# Patient Record
Sex: MALE | Race: WHITE | NOT HISPANIC OR LATINO | URBAN - METROPOLITAN AREA
[De-identification: names, ages, dates, MRNs, and addresses within clinical notes are randomized per-mention and may not be internally consistent; named-entity substitution may affect disease eponyms.]

---

## 2018-09-13 ENCOUNTER — EMERGENCY (EMERGENCY)
Facility: HOSPITAL | Age: 43
LOS: 0 days | Discharge: HOME | End: 2018-09-13
Attending: EMERGENCY MEDICINE | Admitting: EMERGENCY MEDICINE

## 2018-09-13 VITALS
HEART RATE: 64 BPM | SYSTOLIC BLOOD PRESSURE: 142 MMHG | RESPIRATION RATE: 18 BRPM | OXYGEN SATURATION: 99 % | HEIGHT: 67 IN | TEMPERATURE: 97 F | DIASTOLIC BLOOD PRESSURE: 95 MMHG | WEIGHT: 220.02 LBS

## 2018-09-13 DIAGNOSIS — S60.222A CONTUSION OF LEFT HAND, INITIAL ENCOUNTER: ICD-10-CM

## 2018-09-13 DIAGNOSIS — Z91.041 RADIOGRAPHIC DYE ALLERGY STATUS: ICD-10-CM

## 2018-09-13 DIAGNOSIS — Z79.899 OTHER LONG TERM (CURRENT) DRUG THERAPY: ICD-10-CM
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
## 2018-09-13 DIAGNOSIS — M79.643 PAIN IN UNSPECIFIED HAND: ICD-10-CM

## 2018-09-13 DIAGNOSIS — I10 ESSENTIAL (PRIMARY) HYPERTENSION: ICD-10-CM
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
## 2018-09-13 RX ORDER — LOSARTAN POTASSIUM 100 MG/1
1 TABLET, FILM COATED ORAL
Qty: 0 | Refills: 0 | COMMUNITY

## 2018-09-13 NOTE — ED PROVIDER NOTE - NS ED ROS FT
Review of Systems  Constitutional: (-) fever  Musculoskeletal: (+) swelling   Integumentary: (-) rash, (-) laceration  Neurological: (-) numbness

## 2018-09-13 NOTE — ED PROVIDER NOTE - PHYSICAL EXAMINATION
Vital Signs: I have reviewed the initial vital signs.  Constitutional: WDWN in nad.   Integumentary: No lac or abrasion, + ecchymosis  CV: brisk cap refill  Musculoskeletal: FROM, + swelling to dorsum left hand with tendernes at middle MCP  Neurologic: AAOx3,  good tone, equal strength

## 2018-09-13 NOTE — ED PROVIDER NOTE - MEDICAL DECISION MAKING DETAILS
x ray results reviewed with pt . Will place splint, Rec ice, elevate and follow up with Ortho. Pt verbalizes understanding

## 2018-09-13 NOTE — ED PROVIDER NOTE - OBJECTIVE STATEMENT
42 yo M presents with c/o pain and swelling to left hand after getting it caught in the car door this am.  Pt used ice with improvement.

## 2021-03-28 ENCOUNTER — EMERGENCY (EMERGENCY)
Facility: HOSPITAL | Age: 46
LOS: 0 days | Discharge: HOME | End: 2021-03-28
Attending: STUDENT IN AN ORGANIZED HEALTH CARE EDUCATION/TRAINING PROGRAM | Admitting: STUDENT IN AN ORGANIZED HEALTH CARE EDUCATION/TRAINING PROGRAM
Payer: OTHER MISCELLANEOUS

## 2021-03-28 VITALS
SYSTOLIC BLOOD PRESSURE: 151 MMHG | HEIGHT: 67 IN | RESPIRATION RATE: 18 BRPM | TEMPERATURE: 98 F | HEART RATE: 86 BPM | OXYGEN SATURATION: 98 % | DIASTOLIC BLOOD PRESSURE: 90 MMHG

## 2021-03-28 DIAGNOSIS — Z79.899 OTHER LONG TERM (CURRENT) DRUG THERAPY: ICD-10-CM

## 2021-03-28 DIAGNOSIS — I10 ESSENTIAL (PRIMARY) HYPERTENSION: ICD-10-CM

## 2021-03-28 DIAGNOSIS — S89.91XA UNSPECIFIED INJURY OF RIGHT LOWER LEG, INITIAL ENCOUNTER: ICD-10-CM
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
## 2021-03-28 PROCEDURE — 73562 X-RAY EXAM OF KNEE 3: CPT | Mod: 26,RT

## 2021-03-28 PROCEDURE — 99283 EMERGENCY DEPT VISIT LOW MDM: CPT

## 2021-03-28 NOTE — ED PROVIDER NOTE - PHYSICAL EXAMINATION
Physical Exam    Vital Signs: I have reviewed the initial vital signs.  Constitutional: well-nourished, appears stated age, no acute distress  Eyes: Conjunctiva pink, Sclera clear, PERRLA, EOMI.  Musculoskeletal: supple neck, no lower extremity edema, no midline tenderness. TTP of the lateral aspect of the R knee, no swelling, bruising, deformity or crepitus noted. FROM, 5/5 strength and no sensory def noted.   Integumentary: warm, dry, no rash  Neurologic: awake, alert, cranial nerves II-XII grossly intact, extremities’ motor and sensory functions grossly intact  Psychiatric: appropriate mood, appropriate affect

## 2021-03-28 NOTE — ED PROVIDER NOTE - CLINICAL SUMMARY MEDICAL DECISION MAKING FREE TEXT BOX
46M was stepping off a rig when he hyperextended and twisted his R knee. No falls, head trauma, and was ambulatory at the scene. + ttp lateral joint line of L knee. No crepitus, clicking, joint effusion, warmth, erythema, pain with ROM, anayeli's. pt well appearing, lungs ctab, rrr, abd soft nt, brisk cap refill, extremities warm, pulses equal b/l pt/dp. XR knee shows no acute injuries, mild DJD noted. Knee brace applied, and f/u with ortho. I have fully discussed the medical management and delivery of care with the patient. I have discussed any available labs, imaging and treatment options with the patient. All Questions answered at the bedside and printed copies of all results provided and recommended to review with PCP. Patient confirms understanding and has been given detailed return precautions. Patient instructed to return to the ED should symptoms persist or worsen. Patient has demonstrated capacity and has verbalized understanding. Patient is well appearing upon discharge, ambulatory with a steady gait.

## 2021-03-28 NOTE — ED PROVIDER NOTE - CARE PROVIDER_API CALL
Otis Ling (MD)  Orthopaedic Surgery  [address] [place] [address]  Phone: [phone]  Fax: [phone]  Follow Up Time: 1-3 Days

## 2021-03-28 NOTE — ED ADULT NURSE NOTE - NSIMPLEMENTINTERV_GEN_ALL_ED
Implemented All Universal Safety Interventions:  [place] to call system. Call bell, personal items and telephone within reach. Instruct patient to call for assistance. Room bathroom lighting operational. Non-slip footwear when patient is off stretcher. Physically safe environment: no spills, clutter or unnecessary equipment. Stretcher in lowest position, wheels locked, appropriate side rails in place.

## 2021-03-28 NOTE — ED PROVIDER NOTE - OBJECTIVE STATEMENT
Pt is a 46 year old male with PMH HTN presents to ED with complaints of R knee pain. Pt states is a [employer] , was stepping off rig when he hyperextended his R knee and twisted it. Pain is located to the lateral aspect of L knee. pain is mild, non radiating with no alleviating factors, aggravated with walking. Denies any other complaints at this time

## 2021-03-28 NOTE — ED PROVIDER NOTE - PATIENT PORTAL LINK FT
You can access the FollowMyHealth Patient Portal offered by [facility] by registering at the following website: [email or web address]. By joining [employer]’s FollowMyHealth portal, you will also be able to view your health information using other applications (apps) compatible with our system.

## 2021-03-28 NOTE — ED PROVIDER NOTE - NSFOLLOWUPINSTRUCTIONS_ED_ALL_ED_FT
Follow up with your [facility] medical doctor in 1-2 days as well as with the orthopedist    KNEE PAIN - Ambulatory Care     Knee Pain    AMBULATORY CARE:    Knee pain may start suddenly, or it may be a long-term problem. You may have pain on the side, front, or back of your knee. You may have knee stiffness and swelling. You may hear popping sounds or feel like your knee is giving way or locking up as you walk. You may feel pain when you sit, stand, walk, or climb up and down stairs. Knee pain can be caused by conditions such as obesity, inflammation, or strains or tears in ligaments or tendons.    Seek care immediately if:     Your pain is worse, even after treatment.       You cannot bend or straighten your leg completely.       The swelling around your knee does not go down even with treatment.      Your knee is painful and hot to the touch.     Contact your healthcare provider if:     You have questions or concerns about your condition or care.         Treatment will depend on the cause of your pain. You may need any of the following:     NSAIDs help decrease swelling and pain or fever. This medicine is available with or without a doctor's order. NSAIDs can cause stomach bleeding or kidney problems in certain people. If you take blood thinner medicine, always ask your healthcare provider if NSAIDs are safe for you. Always read the medicine label and follow directions.      Acetaminophen decreases pain and fever. It is available without a doctor's order. Ask how much to take and how often to take it. Follow directions. Read the labels of all other medicines you are using to see if they also contain acetaminophen, or ask your doctor or pharmacist. Acetaminophen can cause liver damage if not taken correctly. Do not use more than 4 grams (4,000 milligrams) total of acetaminophen in one day.       Prescription pain medicine may be given. Ask your healthcare provider how to take this medicine safely. Some prescription pain medicines contain acetaminophen. Do not take other medicines that contain acetaminophen without talking to your healthcare provider. Too much acetaminophen may cause liver damage. Prescription pain medicine may cause constipation. Ask your healthcare provider how to prevent or treat constipation.       Steroid injections may be given into your knee. Steroids reduce inflammation and pain.      Surgery may be used for some injuries, such as to repair a torn ACL.    What you can do to manage your symptoms:     Rest your knee so it can heal. Limit activities that increase your pain. Do low-impact exercises, such as walking or swimming.       Apply ice to help reduce swelling and pain. Use an ice pack, or put crushed ice in a plastic bag. Cover it with a towel before you apply it to your knee. Apply ice for 15 to 20 minutes every hour, or as directed.      Apply compression to help reduce swelling. Use a brace or bandage only as directed.      Elevate your knee to help decrease pain and swelling. Elevate your knee while you are sitting or lying down. Prop your leg on pillows to keep your knee above the level of your heart.      Prevent your knee from moving as directed. Your healthcare provider may put on a cast or splint. You may need to wear a leg brace to stabilize your knee. A leg brace can be adjusted to increase your range of motion as your knee heals.Hinged Knee Braces          What you can do to prevent knee pain:     Maintain a healthy weight. Extra weight increases your risk for knee pain. Ask your healthcare provider how much you should weigh. He or she can help you create a safe weight loss plan if you need to lose weight.      Exercise or train properly. Use the correct equipment for sports. Wear shoes that provide good support. Check your posture often as you exercise, play sports, or train for an event. This can help prevent stress and strain on your knees. Rest between sessions so you do not overwork your knees.    Follow up with your healthcare provider within 24 hours or as directed: Write down your questions so you remember to ask them during your visits.

## 2021-03-28 NOTE — ED ADULT NURSE NOTE - OBJECTIVE STATEMENT
Patient is a 46 year old male complaining of right knee pain that started after he twisted it while walking down the stairs. No other pain at this time

## 2021-03-29 PROBLEM — I10 ESSENTIAL (PRIMARY) HYPERTENSION: Chronic | Status: ACTIVE | Noted: 2018-09-13

## 2021-10-26 PROBLEM — Z00.00 ENCOUNTER FOR PREVENTIVE HEALTH EXAMINATION: Status: ACTIVE | Noted: 2021-10-26

## 2021-10-28 ENCOUNTER — APPOINTMENT (OUTPATIENT)
Dept: PLASTIC SURGERY | Facility: CLINIC | Age: 46
End: 2021-10-28
Payer: COMMERCIAL

## 2021-10-28 VITALS — WEIGHT: 225 LBS | BODY MASS INDEX: 35.31 KG/M2 | HEIGHT: 67 IN

## 2021-10-28 DIAGNOSIS — D48.7 NEOPLASM OF UNCERTAIN BEHAVIOR OF OTHER SPECIFIED SITES: ICD-10-CM

## 2021-10-28 DIAGNOSIS — Z86.79 PERSONAL HISTORY OF OTHER DISEASES OF THE CIRCULATORY SYSTEM: ICD-10-CM

## 2021-10-28 DIAGNOSIS — Z78.9 OTHER SPECIFIED HEALTH STATUS: ICD-10-CM

## 2021-10-28 PROCEDURE — 99203 OFFICE O/P NEW LOW 30 MIN: CPT

## 2021-10-28 RX ORDER — LOSARTAN POTASSIUM 100 MG/1
TABLET, FILM COATED ORAL
Refills: 0 | Status: ACTIVE | COMMUNITY

## 2021-10-28 NOTE — PHYSICAL EXAM
[de-identified] : well developed male, NAD [de-identified] : NC/AT [de-identified] : unlabored breathing  [de-identified] : MONALISAR [de-identified] : soft, nontender \par previous skin excision site healed, no evidence of recurrence  [de-identified] : Left lateral canthal small lightly pigmented skin lesion, not raised, no malignant characteristics\par Left volar distal forearm macular lightly pigmented skin lesion, no malignant characteristics

## 2021-10-28 NOTE — ASSESSMENT
[FreeTextEntry1] : 45 yo M with left volar forearm and left lateral canthal skin lesions, benign appearing. \par \par as above\par two benign appearing lesions as described--left dorsal foream <3mm (pale brown, flat) and left lateral canthus (approx 2mm, light brown)\par \par no clinical suspicion\par \par suggest re-evaluation by Dr Escudero (pt has not seen in 6 years)\par \par Due to COVID 19, pre-visit patient instructions were explained to the patient and their symptoms were checked upon arrival.  \par Masks were used by the health care providers and staff and the examination room was cleaned after the patient visit was completed.\par \par Pt elects to follow these lesions at present.  I agree

## 2022-11-13 ENCOUNTER — EMERGENCY (EMERGENCY)
Facility: HOSPITAL | Age: 47
LOS: 0 days | Discharge: HOME | End: 2022-11-13
Attending: EMERGENCY MEDICINE | Admitting: EMERGENCY MEDICINE

## 2022-11-13 VITALS
WEIGHT: 225.09 LBS | TEMPERATURE: 97 F | HEART RATE: 62 BPM | OXYGEN SATURATION: 100 % | RESPIRATION RATE: 18 BRPM | HEIGHT: 67 IN | DIASTOLIC BLOOD PRESSURE: 91 MMHG | SYSTOLIC BLOOD PRESSURE: 186 MMHG

## 2022-11-13 VITALS
SYSTOLIC BLOOD PRESSURE: 153 MMHG | TEMPERATURE: 99 F | RESPIRATION RATE: 18 BRPM | DIASTOLIC BLOOD PRESSURE: 92 MMHG | OXYGEN SATURATION: 98 % | HEART RATE: 72 BPM

## 2022-11-13 DIAGNOSIS — Z91.041 RADIOGRAPHIC DYE ALLERGY STATUS: ICD-10-CM

## 2022-11-13 DIAGNOSIS — M25.511 PAIN IN RIGHT SHOULDER: ICD-10-CM
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
## 2022-11-13 DIAGNOSIS — I10 ESSENTIAL (PRIMARY) HYPERTENSION: ICD-10-CM

## 2022-11-13 DIAGNOSIS — M79.602 PAIN IN LEFT ARM: ICD-10-CM

## 2022-11-13 DIAGNOSIS — I49.3 VENTRICULAR PREMATURE DEPOLARIZATION: ICD-10-CM
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
## 2022-11-13 PROCEDURE — 99284 EMERGENCY DEPT VISIT MOD MDM: CPT

## 2022-11-13 PROCEDURE — 93010 ELECTROCARDIOGRAM REPORT: CPT

## 2022-11-13 NOTE — ED PROVIDER NOTE - PATIENT PORTAL LINK FT
You can access the FollowMyHealth Patient Portal offered by [facility] by registering at the following website: [email or web address]. By joining [employer]’s FollowMyHealth portal, you will also be able to view your health information using other applications (apps) compatible with our system.

## 2022-11-13 NOTE — ED PROVIDER NOTE - PHYSICAL EXAMINATION
Afebrile, hemodynamically stable, saturating well  NAD, well appearing, sitting comfortably in chair, no WOB, speaking full sentences  Head NCAT  EOMI grossly, anicteric  MMM  No JVD  RRR, nml S1/S2, no m/r/g  Lungs CTAB, no w/r/r  Abd soft, entirely NT, ND, nml BS, no rebound or guarding  AAO, CN's 3-12 grossly intact  Pt states unable to pron/supinate elbow 2/2 pain, no stepoff/deformity/swelling noted, nml distal warmth/color/sensation, 2+ radial pulses, <2 sec cap refill  Skin warm, well perfused, no rashes or hives

## 2022-11-13 NOTE — ED ADULT NURSE NOTE - CHIEF COMPLAINT QUOTE
Patient complaining of left bicep pain after opening up a fire hydrant at work. Patient tore his right bicep in the past and states it feels exactly the same

## 2022-11-13 NOTE — ED PROVIDER NOTE - NSFOLLOWUPINSTRUCTIONS_ED_ALL_ED_FT
Please follow up with your orthopedist in 1-2 days.  Please use ibuprofen as needed for pain.  Please return to the emergency department if you have worsening pain, numbness, color change, fever, or any other symptoms.    Arm Pain    WHAT YOU NEED TO KNOW:    Your arm pain may be caused by a number of conditions. Examples include arthritis, nerve problems, or an awkward position while you sleep. X-rays did not show a broken bone in your arm or wrist. Arm pain may be a sign of a serious condition that needs immediate care, such as a heart attack.    DISCHARGE INSTRUCTIONS:    Call your local emergency number (911 in the [place]) for any of the following:   •You have any of the following signs of a heart attack: ?Squeezing, pressure, or pain in your chest  ?You may also have any of the following: ?Discomfort or pain in your back, neck, jaw, stomach, or arm  ?Shortness of breath  ?Nausea or vomiting  ?Lightheadedness or a sudden cold sweat    Return to the emergency department if:   •You have severe pain, or pain that spreads from your arm to other areas.  •You have swelling, tingling, or numbness in your hand or fingers, or the skin turns blue.  •You cannot move your arm.    Call your doctor if:   •You have questions or concerns about your condition or care.    Medicines: You may need any of the following:   •Prescription pain medicine may be given. Ask your healthcare provider how to take this medicine safely. Some prescription pain medicines contain acetaminophen. Do not take other medicines that contain acetaminophen without talking to your healthcare provider. Too much acetaminophen may cause liver damage. Prescription pain medicine may cause constipation. Ask your healthcare provider how to prevent or treat constipation.   •NSAIDs, such as ibuprofen, help decrease swelling, pain, and fever. This medicine is available with or without a doctor's order. NSAIDs can cause stomach bleeding or kidney problems in certain people. If you take blood thinner medicine, always ask your healthcare provider if NSAIDs are safe for you. Always read the medicine label and follow directions.  •Take your medicine as directed. Contact your healthcare provider if you think your medicine is not helping or if you have side effects. Tell your provider if you are allergic to any medicine. Keep a list of the medicines, vitamins, and herbs you take. Include the amounts, and when and why you take them. Bring the list or the pill bottles to follow-up visits. Carry your medicine list with you in case of an emergency.    Self-care:   •Rest your arm as directed. A sling may be used to keep your arm from moving while it heals.  •Apply ice as directed. Ice helps decrease pain and swelling. Ice may also help prevent tissue damage. Use an ice pack, or put crushed ice in a plastic bag. Cover it with a towel. Apply it to your arm for 20 minutes every few hours, or as directed. Ask how many times to apply ice each day, and for how many days.  •Elevate your arm above the level of your heart as often as you can. This will help decrease swelling and pain. Prop your arm on pillows or blankets to keep the area elevated comfortably.  •Adjust your position if you work in front of a computer. You may need arm or wrist supports or change the height of your chair.  •Keep a pain record. Write down when your pain happens and how severe it is. Include any other symptoms you have with your pain. A record will help you keep track of pain cycles. Bring the record with you to your follow-up visits. It may also help your healthcare provider find out what is causing your pain.    Follow up with your doctor as directed: You may need physical therapy. You may need to see an orthopedic specialist. Write down your questions so you remember to ask them during your visits.

## 2022-11-13 NOTE — ED ADULT TRIAGE NOTE - CHIEF COMPLAINT QUOTE
Click on hyperlink to view report     Patient complaining of left bicep pain after opening up a fire hydrant at work. Patient tore his right bicep in the past and states it feels exactly the same

## 2023-03-16 ENCOUNTER — APPOINTMENT (OUTPATIENT)
Dept: PLASTIC SURGERY | Facility: CLINIC | Age: 48
End: 2023-03-16
Payer: COMMERCIAL

## 2023-03-16 PROCEDURE — 99212 OFFICE O/P EST SF 10 MIN: CPT

## 2023-03-16 NOTE — PHYSICAL EXAM
[de-identified] : well developed male, NAD [de-identified] : NC/AT [de-identified] : unlabored breathing  [de-identified] : MONALISAR [de-identified] : soft, nontender \par previous skin excision site healed, no evidence of recurrence  [de-identified] : Left lateral canthal small lightly pigmented skin lesion, not raised, no malignant characteristics\par Left volar distal forearm macular lightly pigmented skin lesion, no malignant characteristics

## 2023-03-16 NOTE — HISTORY OF PRESENT ILLNESS
[FreeTextEntry1] : 45 yo M with PMHx of HTN seen in the past for excision of abdominal dysplastic nevus and NAC benign skin lesion who presents today for evaluation of left forearm and left lower eyelid pigmented skin lesions present for several weeks. Patient has family h/o skin cancer - mom, pt not sure what type. \par \par \par Occupation - \par Nonsmoker

## 2023-03-16 NOTE — ASSESSMENT
[FreeTextEntry1] : 47 yo M with left volar forearm and left lateral canthal skin lesions, benign appearing. \par \par as above\par two benign appearing lesions as described--left dorsal foream <3mm (pale brown, flat) and left lateral canthus (approx 2mm, light brown)\par \par no clinical suspicion\par \par suggest re-evaluation by Dr Escudero (pt has not seen in 6 years)\par \par Due to COVID 19, pre-visit patient instructions were explained to the patient and their symptoms were checked upon arrival.  \par Masks were used by the health care providers and staff and the examination room was cleaned after the patient visit was completed.\par \par Pt elects to follow these lesions at present.  I agree\par \par 3/16/2023\par as above\par has rigth lateral flank lipoma approx 3cm \par relatively deep in subq tissue\par \par offered excision\par pt elects to proceed w in office excision\par \par Regarding the procedure, we discussed scarring, poor wound healing, bleeding, infection, need for additional surgery, and dissatisfaction with the outcome.  Also discussed possibility of keloid and/or hypertrophic scar formation as well as recurrence.  All questions were answered and risks understood.\par \par Due to COVID 19, pre-visit patient instructions were explained to the patient and their symptoms were checked upon arrival.  \par Masks were used by the health care providers and staff and the examination room was cleaned after the patient visit was completed.\par

## 2023-06-18 NOTE — ED PROVIDER NOTE - CLINICAL SUMMARY MEDICAL DECISION MAKING FREE TEXT BOX
Declines analgesia. Character could be 2/2 tendon rupture, pt states unable to range arm and does not want me to further manipulate. No stepoff/deformity and low suspicion for fx/dislocation. Given sling. Declines analgesia. Will f/u with his own orthopedist. Patient is well appearing, NAD, afebrile, hemodynamically stable. Any available tests and studies were discussed with patient. Discharged with instructions in further symptomatic care, return precautions, and need for f/u. Noted HTN, no CP/SOB and ECG unremarkable and low suspicion for epigastric soreness being ACS.
180.34

## 2023-06-27 ENCOUNTER — NON-APPOINTMENT (OUTPATIENT)
Age: 48
End: 2023-06-27

## 2023-06-30 ENCOUNTER — APPOINTMENT (OUTPATIENT)
Dept: PLASTIC SURGERY | Facility: CLINIC | Age: 48
End: 2023-06-30
Payer: COMMERCIAL

## 2023-06-30 DIAGNOSIS — D48.5 NEOPLASM OF UNCERTAIN BEHAVIOR OF SKIN: ICD-10-CM

## 2023-06-30 PROCEDURE — 13101 CMPLX RPR TRUNK 2.6-7.5 CM: CPT | Mod: 59

## 2023-06-30 PROCEDURE — 11406 EXC TR-EXT B9+MARG >4.0 CM: CPT

## 2023-07-10 NOTE — PROCEDURE
[FreeTextEntry6] : Patient is a 48 year old male with a right lateral flank lipoma measuring approximately 4 x 2.5 cm.  \par \par The area was prepped and draped in the usual fashion.  Local anesthetic was administered using 1% lidocaine with epinephrine.\par \par The lipoma was sharply excised.  Area was irrigated copiously.  Complex wound closure was performed in layers.  The wound measured approximately 4.2 cm.\par \par Sterile dressing applied.  \par \par Patient tolerated procedure well and understands post-op instructions.\par \par Sutures Used: 3-0 monocryl

## 2023-07-13 ENCOUNTER — APPOINTMENT (OUTPATIENT)
Dept: PLASTIC SURGERY | Facility: CLINIC | Age: 48
End: 2023-07-13
Payer: COMMERCIAL

## 2023-07-13 DIAGNOSIS — D17.1 BENIGN LIPOMATOUS NEOPLASM OF SKIN AND SUBCUTANEOUS TISSUE OF TRUNK: ICD-10-CM

## 2023-07-13 LAB — CORE LAB BIOPSY: NORMAL

## 2023-07-13 PROCEDURE — 99212 OFFICE O/P EST SF 10 MIN: CPT

## 2023-07-13 NOTE — DATA REVIEWED
[FreeTextEntry1] : Tissue Biopsy             Final\par \par No Documents Attached\par \par \par   Patient:   RENU BARBER\par \par \par Accession:                             [id number]\par \par Collected Date/Time:                   6/30/2023 11:41 EDT\par Received Date/Time:                    7/3/2023 09:38 EDT\par \par Surgical Pathology Report - Auth (Verified)\par \par Specimen(s) Submitted\par Right lateral flank lipoma\par \par Final Diagnosis\par Right lateral flank lipoma, excision:\par - Lipoma\par \par Verified by: Lino Hernadez M.D\par (Electronic Signature)\par Reported on: 07/05/23 13:08 EDT, [facility],\par  [address] [place] [address]\par Phone: [phone]   Fax: [phone]\par _________________________________________________________________\par \par Clinical Information\par Right lateral flank lipoma\par \par \par Perioperative Diagnosis\par D48.5-Neoplasm of uncertain behavior of skin\par \par Gross Description\par Received in formalin labeled "right lateral flank lipoma".  The specimen\par  consists of multiple fragments of tan-yellow adipose tissue measuring\par  4.0 x 2.5 x 1.0 cm in aggregate.  The specimen is inked, sectioned and\par  representative sections submitted.  (2 blocks)\par \par Specimen was received and underwent gross examination at [place] [address].\par \par 07/03/2023 16:20:31 EDT   LB\par \par  \par \par  Ordered by: JAVIER CARROLL IV       Collected/Examined: 30Jun2023 11:41AM       \par Verification Required       Stage: Final       \par  Performed at: [email or web address] (Med Director: Andrei Ramon)       Resulted: [id number] 01:08PM       Last Updated: [id number] 01:08PM       Accession: [id number]

## 2023-07-13 NOTE — HISTORY OF PRESENT ILLNESS
[FreeTextEntry1] : 47 yo M with PMHx of HTN seen in the past for excision of abdominal dysplastic nevus and NAC benign skin lesion who presents today for evaluation of left forearm and left lower eyelid pigmented skin lesions present for several weeks. Patient has family h/o skin cancer - mom, pt not sure what type. \par \par \par Occupation - \par Nonsmoker \par \par Interval hx (7/13/23). Pt here 2 weeks s/p excision of right lateral flank lipoma. Doing well. Denies any f/c or bleeding.

## 2023-07-13 NOTE — ASSESSMENT
[FreeTextEntry1] : 47 yo M with left volar forearm and left lateral canthal skin lesions, benign appearing. Observe for now. \par Now 2 weeks s/p excision of right lateral flank lipoma. Doing well. \par \par - sutures removed, steri strips applied\par - may shower\par - daily Aquaphor, may start silicone creams\par - pathology discussed - lipoma\par - all questions answered\par - derm f/u \par - f/u PRN \par

## 2023-07-13 NOTE — PHYSICAL EXAM
[de-identified] : well developed male, NAD [de-identified] : unlabored breathing  [de-identified] : MONALISAR [de-identified] : soft, nontender, \par previous skin excision site healed, no evidence of recurrence  [de-identified] : right lateral flank incision healing well, c/d/di, no erythema

## 2024-04-18 ENCOUNTER — APPOINTMENT (OUTPATIENT)
Dept: PLASTIC SURGERY | Facility: CLINIC | Age: 49
End: 2024-04-18
Payer: COMMERCIAL

## 2024-04-18 PROCEDURE — 10120 INC&RMVL FB SUBQ TISS SMPL: CPT

## 2024-04-18 PROCEDURE — 99212 OFFICE O/P EST SF 10 MIN: CPT | Mod: 25

## 2024-04-18 NOTE — PROCEDURE
[FreeTextEntry1] : FB left sole foot [FreeTextEntry2] : removal FB left foot [FreeTextEntry3] : local [FreeTextEntry4] : <5cc [FreeTextEntry5] : none [FreeTextEntry6] : Pt noticed small area discoloration sole of left foot near 3rd MPJ.  Was tender when put on a sock few days ago.  No trauma he can recall to area.  Area anesthetized.  Small hemosiderin stained tissue removed.  Small FB (?splinter) removed--<3mm.  Sterile dressing applied.  Toelrated well. [FreeTextEntry7] : none [___ ml Inj] : Anesthesia: [unfilled] ~Uml [1%] : 1% [With Epi] : with epinephrine

## 2024-04-18 NOTE — PHYSICAL EXAM
[de-identified] : well developed male, NAD [de-identified] : unlabored breathing  [de-identified] : MONALISAR [de-identified] : soft, nontender, \par  previous skin excision site healed, no evidence of recurrence  [de-identified] : right lateral flank incision healing well, c/d/di, no erythema

## 2024-04-18 NOTE — DATA REVIEWED
[FreeTextEntry1] : Tissue Biopsy             Final\par  \par  No Documents Attached\par  \par  \par    Patient:   RENU BARBER\par  \par  \par  Accession:                             [id number]\par  \par  Collected Date/Time:                   6/30/2023 11:41 EDT\par  Received Date/Time:                    7/3/2023 09:38 EDT\par  \par  Surgical Pathology Report - Auth (Verified)\par  \par  Specimen(s) Submitted\par  Right lateral flank lipoma\par  \par  Final Diagnosis\par  Right lateral flank lipoma, excision:\par  - Lipoma\par  \par  Verified by: Lino Hernadez M.D\par  (Electronic Signature)\par  Reported on: 07/05/23 13:08 EDT, [facility],\par   [address] [place] [address]\par  Phone: [phone]   Fax: [phone]\par  _________________________________________________________________\par  \par  Clinical Information\par  Right lateral flank lipoma\par  \par  \par  Perioperative Diagnosis\par  D48.5-Neoplasm of uncertain behavior of skin\par  \par  Gross Description\par  Received in formalin labeled "right lateral flank lipoma".  The specimen\par   consists of multiple fragments of tan-yellow adipose tissue measuring\par   4.0 x 2.5 x 1.0 cm in aggregate.  The specimen is inked, sectioned and\par   representative sections submitted.  (2 blocks)\par  \par  Specimen was received and underwent gross examination at [place] [address].\par  \par  07/03/2023 16:20:31 EDT   LB\par  \par   \par  \par   Ordered by: JAVIER CARROLL IV       Collected/Examined: 30Jun2023 11:41AM       \par  Verification Required       Stage: Final       \par   Performed at: [id number] (Med Director: Andrei Ramon)       Resulted: [id number] 01:08PM       Last Updated: [id number] 01:08PM       Accession: [id number]

## 2024-04-18 NOTE — ASSESSMENT
[FreeTextEntry1] : 45 yo M with left volar forearm and left lateral canthal skin lesions, benign appearing. Observe for now.  Now 2 weeks s/p excision of right lateral flank lipoma. Doing well.   - sutures removed, steri strips applied - may shower - daily Aquaphor, may start silicone creams - pathology discussed - lipoma - all questions answered - derm f/u  - f/u PRN    4/18/2024 as above see proc note

## 2024-11-07 ENCOUNTER — NON-APPOINTMENT (OUTPATIENT)
Age: 49
End: 2024-11-07

## 2024-11-21 ENCOUNTER — APPOINTMENT (OUTPATIENT)
Dept: PLASTIC SURGERY | Facility: CLINIC | Age: 49
End: 2024-11-21
Payer: COMMERCIAL

## 2024-11-21 PROCEDURE — 99212 OFFICE O/P EST SF 10 MIN: CPT

## 2024-11-21 PROCEDURE — G2211 COMPLEX E/M VISIT ADD ON: CPT | Mod: NC

## 2024-12-05 ENCOUNTER — OUTPATIENT (OUTPATIENT)
Dept: OUTPATIENT SERVICES | Facility: HOSPITAL | Age: 49
LOS: 1 days | End: 2024-12-05
Payer: COMMERCIAL

## 2024-12-05 DIAGNOSIS — Z00.8 ENCOUNTER FOR OTHER GENERAL EXAMINATION: ICD-10-CM

## 2024-12-05 DIAGNOSIS — R06.02 SHORTNESS OF BREATH: ICD-10-CM

## 2024-12-05 DIAGNOSIS — R94.39 ABNORMAL RESULT OF OTHER CARDIOVASCULAR FUNCTION STUDY: ICD-10-CM

## 2024-12-05 PROCEDURE — 75574 CT ANGIO HRT W/3D IMAGE: CPT

## 2024-12-05 PROCEDURE — 75574 CT ANGIO HRT W/3D IMAGE: CPT | Mod: 26

## 2024-12-06 DIAGNOSIS — R06.02 SHORTNESS OF BREATH: ICD-10-CM

## 2024-12-06 DIAGNOSIS — R94.39 ABNORMAL RESULT OF OTHER CARDIOVASCULAR FUNCTION STUDY: ICD-10-CM

## 2025-01-10 ENCOUNTER — APPOINTMENT (OUTPATIENT)
Dept: PLASTIC SURGERY | Facility: CLINIC | Age: 50
End: 2025-01-10

## 2025-01-10 PROCEDURE — 13101 CMPLX RPR TRUNK 2.6-7.5 CM: CPT

## 2025-01-10 PROCEDURE — 11401 EXC TR-EXT B9+MARG 0.6-1 CM: CPT

## 2025-01-14 LAB — CORE LAB BIOPSY: NORMAL

## 2025-01-23 ENCOUNTER — APPOINTMENT (OUTPATIENT)
Dept: PLASTIC SURGERY | Facility: CLINIC | Age: 50
End: 2025-01-23
Payer: COMMERCIAL

## 2025-01-23 DIAGNOSIS — D23.9 OTHER BENIGN NEOPLASM OF SKIN, UNSPECIFIED: ICD-10-CM

## 2025-01-23 PROCEDURE — 99212 OFFICE O/P EST SF 10 MIN: CPT

## 2025-01-28 ENCOUNTER — APPOINTMENT (OUTPATIENT)
Dept: UROLOGY | Facility: CLINIC | Age: 50
End: 2025-01-28
Payer: COMMERCIAL

## 2025-01-28 VITALS
DIASTOLIC BLOOD PRESSURE: 94 MMHG | HEART RATE: 74 BPM | RESPIRATION RATE: 18 BRPM | TEMPERATURE: 98.2 F | HEIGHT: 67 IN | WEIGHT: 226.25 LBS | OXYGEN SATURATION: 98 % | SYSTOLIC BLOOD PRESSURE: 147 MMHG | BODY MASS INDEX: 35.51 KG/M2

## 2025-01-28 DIAGNOSIS — Z00.00 ENCOUNTER FOR GENERAL ADULT MEDICAL EXAMINATION W/OUT ABNORMAL FINDINGS: ICD-10-CM

## 2025-01-28 DIAGNOSIS — R39.9 UNSPECIFIED SYMPTOMS AND SIGNS INVOLVING THE GENITOURINARY SYSTEM: ICD-10-CM

## 2025-01-28 PROCEDURE — 99202 OFFICE O/P NEW SF 15 MIN: CPT

## 2025-01-28 RX ORDER — AMLODIPINE BESYLATE 5 MG/1
TABLET ORAL
Refills: 0 | Status: ACTIVE | COMMUNITY

## 2025-05-16 NOTE — ED ADULT TRIAGE NOTE - CHIEF COMPLAINT QUOTE
Received External Records-Uploaded and [facility] Linked Labs  in Health Maintenance  and Chart       
L  hand   slammed  in  car  door  this   AM